# Patient Record
Sex: FEMALE | Race: WHITE | NOT HISPANIC OR LATINO | Employment: FULL TIME | URBAN - METROPOLITAN AREA
[De-identification: names, ages, dates, MRNs, and addresses within clinical notes are randomized per-mention and may not be internally consistent; named-entity substitution may affect disease eponyms.]

---

## 2024-02-14 ENCOUNTER — TELEPHONE (OUTPATIENT)
Dept: PRIMARY CARE | Facility: CLINIC | Age: 54
End: 2024-02-14
Payer: COMMERCIAL

## 2024-02-15 ENCOUNTER — APPOINTMENT (OUTPATIENT)
Dept: PRIMARY CARE | Facility: CLINIC | Age: 54
End: 2024-02-15
Payer: COMMERCIAL

## 2024-04-02 ENCOUNTER — APPOINTMENT (OUTPATIENT)
Dept: PRIMARY CARE | Facility: CLINIC | Age: 54
End: 2024-04-02
Payer: COMMERCIAL

## 2024-04-03 ENCOUNTER — OFFICE VISIT (OUTPATIENT)
Dept: PRIMARY CARE | Facility: CLINIC | Age: 54
End: 2024-04-03
Payer: COMMERCIAL

## 2024-04-03 VITALS
BODY MASS INDEX: 43 KG/M2 | WEIGHT: 274 LBS | OXYGEN SATURATION: 98 % | SYSTOLIC BLOOD PRESSURE: 111 MMHG | DIASTOLIC BLOOD PRESSURE: 77 MMHG | HEART RATE: 68 BPM | HEIGHT: 67 IN

## 2024-04-03 DIAGNOSIS — E55.9 VITAMIN D DEFICIENCY: ICD-10-CM

## 2024-04-03 DIAGNOSIS — M79.671 PAIN IN BOTH FEET: ICD-10-CM

## 2024-04-03 DIAGNOSIS — Z12.11 COLON CANCER SCREENING: ICD-10-CM

## 2024-04-03 DIAGNOSIS — J45.20 MILD INTERMITTENT ASTHMA, UNSPECIFIED WHETHER COMPLICATED (HHS-HCC): ICD-10-CM

## 2024-04-03 DIAGNOSIS — N18.31 STAGE 3A CHRONIC KIDNEY DISEASE (MULTI): ICD-10-CM

## 2024-04-03 DIAGNOSIS — M79.672 PAIN IN BOTH FEET: ICD-10-CM

## 2024-04-03 DIAGNOSIS — K21.9 GASTROESOPHAGEAL REFLUX DISEASE WITHOUT ESOPHAGITIS: ICD-10-CM

## 2024-04-03 DIAGNOSIS — Z23 NEED FOR ZOSTER VACCINATION: ICD-10-CM

## 2024-04-03 DIAGNOSIS — R06.81 APNEA: ICD-10-CM

## 2024-04-03 DIAGNOSIS — Z00.00 ANNUAL PHYSICAL EXAM: Primary | ICD-10-CM

## 2024-04-03 DIAGNOSIS — F31.9 BIPOLAR 1 DISORDER (MULTI): ICD-10-CM

## 2024-04-03 DIAGNOSIS — Z12.31 SCREENING MAMMOGRAM FOR BREAST CANCER: ICD-10-CM

## 2024-04-03 PROBLEM — E66.01 MORBID OBESITY (MULTI): Status: ACTIVE | Noted: 2024-04-03

## 2024-04-03 PROBLEM — F33.42 RECURRENT MAJOR DEPRESSIVE DISORDER, IN FULL REMISSION (CMS-HCC): Status: ACTIVE | Noted: 2024-04-03

## 2024-04-03 PROBLEM — G43.009 MIGRAINE WITHOUT AURA AND WITHOUT STATUS MIGRAINOSUS, NOT INTRACTABLE: Status: ACTIVE | Noted: 2024-04-03

## 2024-04-03 PROBLEM — F41.8 DEPRESSION WITH ANXIETY: Status: ACTIVE | Noted: 2019-10-30

## 2024-04-03 PROBLEM — K58.0 IRRITABLE BOWEL SYNDROME WITH DIARRHEA: Status: ACTIVE | Noted: 2024-04-03

## 2024-04-03 PROBLEM — N39.3 STRESS INCONTINENCE: Status: ACTIVE | Noted: 2024-04-03

## 2024-04-03 PROCEDURE — 90750 HZV VACC RECOMBINANT IM: CPT | Performed by: INTERNAL MEDICINE

## 2024-04-03 PROCEDURE — 99204 OFFICE O/P NEW MOD 45 MIN: CPT | Performed by: INTERNAL MEDICINE

## 2024-04-03 PROCEDURE — 1036F TOBACCO NON-USER: CPT | Performed by: INTERNAL MEDICINE

## 2024-04-03 PROCEDURE — 90471 IMMUNIZATION ADMIN: CPT | Performed by: INTERNAL MEDICINE

## 2024-04-03 RX ORDER — LEVONORGESTREL 52 MG/1
1 INTRAUTERINE DEVICE INTRAUTERINE ONCE
COMMUNITY

## 2024-04-03 RX ORDER — DULOXETIN HYDROCHLORIDE 60 MG/1
60 CAPSULE, DELAYED RELEASE ORAL DAILY
COMMUNITY
Start: 2016-03-21

## 2024-04-03 RX ORDER — CLONAZEPAM 0.5 MG/1
0.5 TABLET ORAL
COMMUNITY
Start: 2023-10-13

## 2024-04-03 RX ORDER — LEVALBUTEROL INHALATION SOLUTION 0.63 MG/3ML
1 SOLUTION RESPIRATORY (INHALATION) EVERY 6 HOURS PRN
Qty: 72 ML | Refills: 2 | Status: SHIPPED | OUTPATIENT
Start: 2024-04-03 | End: 2025-04-03

## 2024-04-03 RX ORDER — ARIPIPRAZOLE 10 MG/1
10 TABLET ORAL DAILY
COMMUNITY
Start: 2023-11-03

## 2024-04-03 RX ORDER — LANOLIN ALCOHOL/MO/W.PET/CERES
50 CREAM (GRAM) TOPICAL DAILY
COMMUNITY

## 2024-04-03 RX ORDER — ALBUTEROL SULFATE 90 UG/1
2 AEROSOL, METERED RESPIRATORY (INHALATION) EVERY 4 HOURS PRN
COMMUNITY
Start: 2005-12-02

## 2024-04-03 RX ORDER — LAMOTRIGINE 200 MG/1
200 TABLET ORAL DAILY
COMMUNITY
Start: 2016-03-16

## 2024-04-03 RX ORDER — LITHIUM CARBONATE 300 MG/1
600 CAPSULE ORAL DAILY
COMMUNITY
Start: 2016-03-16

## 2024-04-03 RX ORDER — OMEPRAZOLE 20 MG/1
20 TABLET, DELAYED RELEASE ORAL
COMMUNITY

## 2024-04-03 RX ORDER — ACETAMINOPHEN 500 MG
2000 TABLET ORAL DAILY
COMMUNITY
Start: 2024-03-07

## 2024-04-03 ASSESSMENT — ENCOUNTER SYMPTOMS
COUGH: 0
VOMITING: 0
SORE THROAT: 0
HEADACHES: 0
SHORTNESS OF BREATH: 0
FREQUENCY: 0
CONSTIPATION: 0
LIGHT-HEADEDNESS: 0
DIFFICULTY URINATING: 0
ARTHRALGIAS: 0
DIZZINESS: 0
WEAKNESS: 0
NAUSEA: 0
PALPITATIONS: 0
FATIGUE: 0
DIARRHEA: 0
DYSURIA: 0
HEMATURIA: 0
FEVER: 0
MYALGIAS: 0
CHILLS: 0

## 2024-04-03 ASSESSMENT — PAIN SCALES - GENERAL: PAINLEVEL: 0-NO PAIN

## 2024-04-03 NOTE — ASSESSMENT & PLAN NOTE
Patient does have mild asthma but uses Xopenex on a as needed basis.  She notes that she only has to use it a few times a year when she has exacerbations and has not used it for several months.

## 2024-04-03 NOTE — PATIENT INSTRUCTIONS
Set up mammogram    Get fasting labs    Try Dr Brooks or Ella for Mirena removal    Set up appointment with Samaria for feet    Set up appointment for sleep apnea eval    Follow up Dr Contreras in 1 year for annual physical

## 2024-04-03 NOTE — ASSESSMENT & PLAN NOTE
Patient sees a psychiatrist at [facility] and    Falls name Rosalinda Torres who manages all of her medications including lithium, Abilify, Cymbalta and Klonopin for her bipolar 1 disorder.

## 2024-04-03 NOTE — PROGRESS NOTES
Subjective   Patient ID: Elinor Velazquez is a 53 y.o. female who presents for new patient visit.    Patient is here to establish with new primary care physician as she has not had one in several years.  She does see a psychiatrist Dr. Lonnie Torres at [facility] and [facility] for her bipolar disorder.  Her significant other has noticed snoring, open mouth breathing and apneic episodes at night and he is quite concerned that she may have sleep apnea and would like an evaluation    Patient has mild asthma and uses Xopenex periodically when she has an exacerbation and does need a refill.  Albuterol makes her shaky and palpitations which is why she uses the Xopenex.      Finally she has foot problems constantly and ankle problems and would like a podiatry referral    Patient also has a Mirena in place she is not sure how old it is but it should be evaluated and removed so she was given an OB/GYN to have this evaluated as well.        Review of Systems   Constitutional:  Negative for chills, fatigue and fever.   HENT:  Negative for sore throat.    Eyes:  Negative for visual disturbance.   Respiratory:  Negative for cough and shortness of breath.    Cardiovascular:  Negative for chest pain, palpitations and leg swelling.   Gastrointestinal:  Negative for constipation, diarrhea, nausea and vomiting.   Genitourinary:  Negative for difficulty urinating, dysuria, frequency, hematuria and urgency.   Musculoskeletal:  Negative for arthralgias and myalgias.   Skin:  Negative for rash.   Neurological:  Negative for dizziness, syncope, weakness, light-headedness and headaches.       Objective   Medication Documentation Review Audit       Reviewed by Jaqui Contreras MD (Physician) on 04/03/24 at 0928      Medication Order Taking? Sig Documenting Provider Last Dose Status   albuterol 90 mcg/actuation inhaler [id number] Yes 2 puffs every 4 hours if needed. Historical Provider, MD  Active   ARIPiprazole (Abilify) 10 mg tablet  [id number] Yes Take 1 tablet (10 mg) by mouth once daily. Historical Provider, MD  Active   cholecalciferol (Vitamin D-3) 50 mcg (2,000 unit) capsule [id number] Yes Take 1 capsule (50 mcg) by mouth early in the morning.. Historical Provider, MD  Active   clonazePAM (KlonoPIN) 0.5 mg tablet [id number] Yes Take 1 tablet (0.5 mg) by mouth once daily. Historical Provider, MD  Active   DULoxetine (Cymbalta) 60 mg DR capsule [id number] Yes Take 1 capsule (60 mg) by mouth once daily. Historical Provider, MD  Active   lamoTRIgine (LaMICtal) 200 mg tablet [id number] Yes Take 1 tablet (200 mg) by mouth once daily. Historical Provider, MD  Active   levonorgestrel (Mirena) 21 mcg/24 hours (8 yrs) 52 mg IUD [id number]  52 mg by intrauterine route 1 time. Historical Provider, MD  Active   lithium 300 mg capsule [id number] Yes Take 2 capsules (600 mg) by mouth once daily. Historical Provider, MD  Active   multivitamin Complete formula with  (Complete ) 3,000-800 unit-mcg capsule [id number]  Take 1 capsule by mouth once daily. Historical Provider, MD  Active   omeprazole OTC (PriLOSEC OTC) 20 mg EC tablet [id number]  Take 1 tablet (20 mg) by mouth once daily. Historical Provider, MD  Active   pyridoxine (Vitamin B-6) 50 mg tablet [id number]  Take 1 tablet (50 mg) by mouth once daily. Historical Provider, MD  Active                  Allergies   Allergen Reactions    Carbamazepine Wheezing     Wheezing and chest tightness    Cat Dander Itching    Gabapentin Unknown    Grass Pollen Runny nose    House Dust Runny nose    Tree And Shrub Pollen Runny nose    Valacyclovir Wheezing     ppts asthma attack     Physical Exam  Constitutional:       Appearance: Normal appearance. She is obese.   HENT:      Head: Normocephalic and atraumatic.      Nose: Nose normal.   Eyes:      Extraocular Movements: Extraocular movements intact.      Pupils: Pupils are equal, round, and reactive to light.   Cardiovascular:      Rate and Rhythm: Normal rate and  "regular rhythm.   Pulmonary:      Breath sounds: Normal breath sounds.   Abdominal:      General: Abdomen is flat. Bowel sounds are normal.      Palpations: Abdomen is soft.   Musculoskeletal:      Right lower leg: No edema.      Left lower leg: No edema.   Neurological:      Mental Status: She is alert.     /77 (BP Location: Left arm, Patient Position: Sitting)   Pulse 68   Ht 1.702 m (5' 7\") Comment: w/o shoes on  Wt 124 kg (274 lb)   SpO2 98%   BMI 42.91 kg/m²       Assessment/Plan   Problem List Items Addressed This Visit       Asthma     Patient does have mild asthma but uses Xopenex on a as needed basis.  She notes that she only has to use it a few times a year when she has exacerbations and has not used it for several months.         Relevant Medications    levalbuterol (Xopenex) 0.63 mg/3 mL nebulizer solution    Bipolar 1 disorder (CMS/HCC)     Patient sees a psychiatrist at [facility] and    Falls name Rosalinda Torres who manages all of her medications including lithium, Abilify, Cymbalta and Klonopin for her bipolar 1 disorder.         Gastroesophageal reflux disease     Patient reports reflux symptoms which are well-controlled with the Prilosec 20 mg daily OTC.         Stage 3a chronic kidney disease (CMS/HCC)     The epic computer system pulled over chronic kidney disease however we are still pending lab work to confirm this diagnosis.         Vitamin D deficiency     Patient is due for blood work so we will be checking vitamin D level.         Annual physical exam - Primary     On general health maintenance patient did get 1 Shingrix vaccine but never got the second so she will be given the second shot today    Patient has a Mirena in place IUD but she is not sure how old it is.  I will refer her to OB/GYN for evaluation and possible removal of the Mirena    3.  Patient has never had a colon evaluated we discussed colonoscopy she declines.  She is however willing to go through with a Cologuard " test which we will order    Patient needs baseline labs which were ordered             Relevant Orders    Lipid Panel    CBC    Comprehensive Metabolic Panel    TSH with reflex to Free T4 if abnormal    Vitamin D 25-Hydroxy,Total (for eval of Vitamin D levels)    Apnea     Patient's significant other has noticed that she sleeps with her mouth open, and appears to have apneic episodes where she is not breathing and she wakes herself up.  We will give her referral to sleep medicine for proper evaluation for obstructive sleep apnea.         Relevant Orders    Referral to Adult Sleep Medicine     Other Visit Diagnoses       Screening mammogram for breast cancer        Relevant Orders    BI mammo bilateral screening tomosynthesis    Colon cancer screening        Relevant Orders    Cologuard® colon cancer screening    Pain in both feet        Relevant Orders    Referral to Podiatry                   It has been a pleasure seeing you.  Jaqui Contreras MD

## 2024-04-03 NOTE — ASSESSMENT & PLAN NOTE
The epic computer system pulled over chronic kidney disease however we are still pending lab work to confirm this diagnosis.

## 2024-04-03 NOTE — ASSESSMENT & PLAN NOTE
On general health maintenance patient did get 1 Shingrix vaccine but never got the second so she will be given the second shot today    Patient has a Mirena in place IUD but she is not sure how old it is.  I will refer her to OB/GYN for evaluation and possible removal of the Mirena    3.  Patient has never had a colon evaluated we discussed colonoscopy she declines.  She is however willing to go through with a Cologuard test which we will order    Patient needs baseline labs which were ordered

## 2024-04-03 NOTE — ASSESSMENT & PLAN NOTE
Patient's significant other has noticed that she sleeps with her mouth open, and appears to have apneic episodes where she is not breathing and she wakes herself up.  We will give her referral to sleep medicine for proper evaluation for obstructive sleep apnea.

## 2024-04-19 LAB — NONINV COLON CA DNA+OCC BLD SCRN STL QL: NEGATIVE

## 2024-05-06 ENCOUNTER — HOSPITAL ENCOUNTER (OUTPATIENT)
Dept: RADIOLOGY | Facility: EXTERNAL LOCATION | Age: 54
Discharge: HOME | End: 2024-05-06

## 2024-05-06 ENCOUNTER — LAB (OUTPATIENT)
Dept: LAB | Facility: LAB | Age: 54
End: 2024-05-06
Payer: COMMERCIAL

## 2024-05-06 ENCOUNTER — HOSPITAL ENCOUNTER (OUTPATIENT)
Dept: RADIOLOGY | Facility: CLINIC | Age: 54
Discharge: HOME | End: 2024-05-06
Payer: COMMERCIAL

## 2024-05-06 VITALS — WEIGHT: 274 LBS | HEIGHT: 67 IN | BODY MASS INDEX: 43 KG/M2

## 2024-05-06 DIAGNOSIS — N28.9 RENAL INSUFFICIENCY: Primary | ICD-10-CM

## 2024-05-06 DIAGNOSIS — Z00.00 ANNUAL PHYSICAL EXAM: ICD-10-CM

## 2024-05-06 DIAGNOSIS — Z12.31 SCREENING MAMMOGRAM FOR BREAST CANCER: ICD-10-CM

## 2024-05-06 LAB
25(OH)D3 SERPL-MCNC: 49 NG/ML (ref 30–100)
ALBUMIN SERPL BCP-MCNC: 4.2 G/DL (ref 3.4–5)
ALP SERPL-CCNC: 96 U/L (ref 33–110)
ALT SERPL W P-5'-P-CCNC: 7 U/L (ref 7–45)
ANION GAP SERPL CALC-SCNC: 11 MMOL/L (ref 10–20)
AST SERPL W P-5'-P-CCNC: 13 U/L (ref 9–39)
BILIRUB SERPL-MCNC: 0.6 MG/DL (ref 0–1.2)
BUN SERPL-MCNC: 14 MG/DL (ref 6–23)
CALCIUM SERPL-MCNC: 9.6 MG/DL (ref 8.6–10.6)
CHLORIDE SERPL-SCNC: 107 MMOL/L (ref 98–107)
CHOLEST SERPL-MCNC: 187 MG/DL (ref 0–199)
CHOLESTEROL/HDL RATIO: 3.2
CO2 SERPL-SCNC: 27 MMOL/L (ref 21–32)
CREAT SERPL-MCNC: 1.26 MG/DL (ref 0.5–1.05)
EGFRCR SERPLBLD CKD-EPI 2021: 51 ML/MIN/1.73M*2
ERYTHROCYTE [DISTWIDTH] IN BLOOD BY AUTOMATED COUNT: 13.2 % (ref 11.5–14.5)
GLUCOSE SERPL-MCNC: 96 MG/DL (ref 74–99)
HCT VFR BLD AUTO: 42.7 % (ref 36–46)
HDLC SERPL-MCNC: 57.9 MG/DL
HGB BLD-MCNC: 13.7 G/DL (ref 12–16)
LDLC SERPL CALC-MCNC: 99 MG/DL
MCH RBC QN AUTO: 29.5 PG (ref 26–34)
MCHC RBC AUTO-ENTMCNC: 32.1 G/DL (ref 32–36)
MCV RBC AUTO: 92 FL (ref 80–100)
NON HDL CHOLESTEROL: 129 MG/DL (ref 0–149)
NRBC BLD-RTO: 0 /100 WBCS (ref 0–0)
PLATELET # BLD AUTO: 349 X10*3/UL (ref 150–450)
POTASSIUM SERPL-SCNC: 4.1 MMOL/L (ref 3.5–5.3)
PROT SERPL-MCNC: 7.2 G/DL (ref 6.4–8.2)
RBC # BLD AUTO: 4.64 X10*6/UL (ref 4–5.2)
SODIUM SERPL-SCNC: 141 MMOL/L (ref 136–145)
TRIGL SERPL-MCNC: 153 MG/DL (ref 0–149)
TSH SERPL-ACNC: 3.51 MIU/L (ref 0.44–3.98)
VLDL: 31 MG/DL (ref 0–40)
WBC # BLD AUTO: 7.1 X10*3/UL (ref 4.4–11.3)

## 2024-05-06 PROCEDURE — 36415 COLL VENOUS BLD VENIPUNCTURE: CPT

## 2024-05-06 PROCEDURE — 85027 COMPLETE CBC AUTOMATED: CPT

## 2024-05-06 PROCEDURE — 84443 ASSAY THYROID STIM HORMONE: CPT

## 2024-05-06 PROCEDURE — 77067 SCR MAMMO BI INCL CAD: CPT | Performed by: RADIOLOGY

## 2024-05-06 PROCEDURE — 82306 VITAMIN D 25 HYDROXY: CPT

## 2024-05-06 PROCEDURE — 80053 COMPREHEN METABOLIC PANEL: CPT

## 2024-05-06 PROCEDURE — 77063 BREAST TOMOSYNTHESIS BI: CPT | Performed by: RADIOLOGY

## 2024-05-06 PROCEDURE — 77067 SCR MAMMO BI INCL CAD: CPT

## 2024-05-06 PROCEDURE — 80061 LIPID PANEL: CPT

## 2024-05-09 ENCOUNTER — TELEPHONE (OUTPATIENT)
Dept: PRIMARY CARE | Facility: CLINIC | Age: 54
End: 2024-05-09
Payer: COMMERCIAL

## 2024-05-09 NOTE — TELEPHONE ENCOUNTER
----- Message from Jaqui Contreras MD sent at 5/9/2024  7:22 AM EDT -----  Please notify patient that her mammogram showed no evidence of cancer.  Her next mammogram will be due in 1 year.

## 2024-05-10 ENCOUNTER — TELEPHONE (OUTPATIENT)
Dept: PRIMARY CARE | Facility: CLINIC | Age: 54
End: 2024-05-10
Payer: COMMERCIAL

## 2024-05-13 ENCOUNTER — OFFICE VISIT (OUTPATIENT)
Dept: PODIATRY | Facility: CLINIC | Age: 54
End: 2024-05-13
Payer: COMMERCIAL

## 2024-05-13 DIAGNOSIS — M21.42 PES PLANUS OF BOTH FEET: ICD-10-CM

## 2024-05-13 DIAGNOSIS — M79.672 PAIN IN BOTH FEET: ICD-10-CM

## 2024-05-13 DIAGNOSIS — M20.42 HAMMERTOES OF BOTH FEET: Primary | ICD-10-CM

## 2024-05-13 DIAGNOSIS — M20.41 HAMMERTOES OF BOTH FEET: Primary | ICD-10-CM

## 2024-05-13 DIAGNOSIS — M79.671 PAIN IN BOTH FEET: ICD-10-CM

## 2024-05-13 DIAGNOSIS — M21.41 PES PLANUS OF BOTH FEET: ICD-10-CM

## 2024-05-13 PROCEDURE — 99202 OFFICE O/P NEW SF 15 MIN: CPT | Performed by: PODIATRIST

## 2024-05-14 NOTE — PROGRESS NOTES
CC: foot pain    HPI:  Patient seen as new pt for foot pain to the toes and feet, no acute injury, pain with walking, long standing problem    PCP: Dr. Contreras  Last visit: 4-3-24     PMH  No past medical history on file.  MEDS    Current Outpatient Medications:     albuterol 90 mcg/actuation inhaler, 2 puffs every 4 hours if needed., Disp: , Rfl:     ARIPiprazole (Abilify) 10 mg tablet, Take 1 tablet (10 mg) by mouth once daily., Disp: , Rfl:     cholecalciferol (Vitamin D-3) 50 mcg (2,000 unit) capsule, Take 1 capsule (50 mcg) by mouth early in the morning.., Disp: , Rfl:     clonazePAM (KlonoPIN) 0.5 mg tablet, Take 1 tablet (0.5 mg) by mouth once daily., Disp: , Rfl:     DULoxetine (Cymbalta) 60 mg DR capsule, Take 1 capsule (60 mg) by mouth once daily., Disp: , Rfl:     lamoTRIgine (LaMICtal) 200 mg tablet, Take 1 tablet (200 mg) by mouth once daily., Disp: , Rfl:     levalbuterol (Xopenex) 0.63 mg/3 mL nebulizer solution, Take 1 ampule by nebulization every 6 hours if needed for wheezing., Disp: 72 mL, Rfl: 2    levonorgestrel (Mirena) 21 mcg/24 hours (8 yrs) 52 mg IUD, 52 mg by intrauterine route 1 time., Disp: , Rfl:     lithium 300 mg capsule, Take 2 capsules (600 mg) by mouth once daily., Disp: , Rfl:     multivitamin Complete formula with  (Complete ) 3,000-800 unit-mcg capsule, Take 1 capsule by mouth once daily., Disp: , Rfl:     omeprazole OTC (PriLOSEC OTC) 20 mg EC tablet, Take 1 tablet (20 mg) by mouth once daily., Disp: , Rfl:     pyridoxine (Vitamin B-6) 50 mg tablet, Take 1 tablet (50 mg) by mouth once daily., Disp: , Rfl:   Allergies  Allergies   Allergen Reactions    Carbamazepine Wheezing     Wheezing and chest tightness    Cat Dander Itching    Gabapentin Unknown    Grass Pollen Runny nose    House Dust Runny nose    Tree And Shrub Pollen Runny nose    Valacyclovir Wheezing     ppts asthma attack     Social History     Socioeconomic History    Marital status: Significant Other      Spouse name: Not on file    Number of children: 0    Years of education: Not on file    Highest education level: Bachelor's degree (e.g., BA, AB, BS)   Occupational History    Occupation: customer service on phones   Tobacco Use    Smoking status: Former     Current packs/day: 0.00     Average packs/day: 0.3 packs/day for 4.0 years (1.0 ttl pk-yrs)     Types: Cigarettes     Start date:      Quit date:      Years since quittin.3     Passive exposure: Never    Smokeless tobacco: Never   Substance and Sexual Activity    Alcohol use: Yes     Comment: once or twice a month if any    Drug use: Yes     Types: Marijuana     Comment: Medical card    Sexual activity: Not on file   Other Topics Concern    Not on file   Social History Narrative    Not on file     Social Determinants of Health     Financial Resource Strain: Not on file   Food Insecurity: Not on file   Transportation Needs: Not on file   Physical Activity: Not on file   Stress: Not on file   Social Connections: Not on file   Intimate Partner Violence: Not on file   Housing Stability: Not on file     Family History   Problem Relation Name Age of Onset    Bipolar disorder Mother      Osteoporosis Mother      Diabetes Father      Emphysema Maternal Grandmother      Lung cancer Maternal Grandfather       Past Surgical History:   Procedure Laterality Date    ANKLE FRACTURE SURGERY Left     DILATION AND CURETTAGE OF UTERUS      WISDOM TOOTH EXTRACTION         REVIEW OF SYSTEMS    + as noted above in HPI.       Physical examination:   On General Observation: Patient is a pleasant, cooperative, well developed 54 y.o.  adult female. The patient is alert and oriented to time, place and person. Patient has normal affect and mood.  There were no vitals taken for this visit.    Vascular:  DP and PT pulses are 2/4 b/l.  mid edema noted. no varicosities b/l.  CFT  5 seconds to all digits bilateral.  Skin temperature is warm to warm from proximal to distal  bilateral.      Muscular: Strength is 5/5 b/l.  Motor strength is normal and symmetric proximally, as well as distally, in all four extremities. Good mobility of all extremities.  Tenderness to toes.     Neuro:  Proprioception present.   Sensation to vibration is  intact. Protective sensation present  at all pedal sites via [place] Jose F 5.07 monofilament bilateral.  Light touch present bilateral.     Derm:  No rashes, lesions, or ecchymosis.     Ortho:  Reducible hammertoes are present toes 2-4 b/l le, reducible at the pipj, bunion is present 1st mpj b/l le, loss of the medial arch, heel inversion with toe raise.      ASSESSMENT:    Hammertoes b/l l  HAV  Pes planus  Pain feet     PLAN:   Consult  A comprehensive history and physical examination were preformed. The patient was educated on clinical findings, diagnosis and treatment plans. Patient understands all that has been explained and all questions were answered to apparent satisfaction.   -Reviewed etiology of the above and treatment options, will obtain xrays of the feet and recommend orthotics with crest pads to address the hammertoes and pes planus.    Thank you for referral        Phuc Mera DPM

## 2024-05-15 ENCOUNTER — HOSPITAL ENCOUNTER (OUTPATIENT)
Dept: RADIOLOGY | Facility: CLINIC | Age: 54
Discharge: HOME | End: 2024-05-15
Payer: COMMERCIAL

## 2024-05-15 DIAGNOSIS — M79.671 PAIN IN BOTH FEET: ICD-10-CM

## 2024-05-15 DIAGNOSIS — M20.42 HAMMERTOES OF BOTH FEET: ICD-10-CM

## 2024-05-15 DIAGNOSIS — M79.672 PAIN IN BOTH FEET: ICD-10-CM

## 2024-05-15 DIAGNOSIS — M20.41 HAMMERTOES OF BOTH FEET: ICD-10-CM

## 2024-05-15 PROCEDURE — 73630 X-RAY EXAM OF FOOT: CPT | Mod: BILATERAL PROCEDURE | Performed by: RADIOLOGY

## 2024-05-15 PROCEDURE — 73630 X-RAY EXAM OF FOOT: CPT | Mod: 50

## 2024-05-21 ENCOUNTER — TELEPHONE (OUTPATIENT)
Dept: PRIMARY CARE | Facility: CLINIC | Age: 54
End: 2024-05-21
Payer: COMMERCIAL

## 2024-05-21 NOTE — TELEPHONE ENCOUNTER
B/C & B/S OF ILL PPO PLUS PLAN  [phone]  PER: AVR  EFFECTIVE 11/1/22 TO CURRENT  JUSTIN IS IN NETWORK  CPT  X2  DX M21.41 AND M21.42-AVR STATES THAT ORTHOTICS ARE COVERED, BASED ON MEDICAL NECESSITY BUT DOES NOT STATE WHETHER DX M21.41 AND M21.42 ARE SPECIFICALLY COVERED DIAGNOSIS'.  NO COPAY APPLIES  PLAN PAYS AT 90% OF THE ALLOWED AMT, ONCE INDIVIDUAL DEDUCTIBLE OF $1100 HAS BEEN MET. SO FAR, $666.26 HAS BEEN SATISFIED.  NO PRIOR AUTHORIZATION IS NEEDED  AVR REFERENCE #[id number]    OM FOR PATIENT WITH THE ABOVE INFO. STRESSED THE POINT THAT I DID NOT SPEAK W A LIVE PERSON. AVR ONLY. WAS TOLD THAT ORTHOTICS ARE COVERED, BASED ON MEDICAL NECESSITY. WAS UNABLE TO CHECK SPECIFIC DIAGNOSIS. ASKED PATIENT TO CALL THE OFFICE TO SCHEDULE, IF SHE WISHES TO PROCEED. THANK YOU!

## 2024-05-21 NOTE — TELEPHONE ENCOUNTER
----- Message from Phuc Mera DPM sent at 5/14/2024 12:21 PM EDT -----  Regarding: orthotics  Please check orthotics dx hammertoes and pes planus thanks

## 2024-05-30 ENCOUNTER — OFFICE VISIT (OUTPATIENT)
Dept: PODIATRY | Facility: CLINIC | Age: 54
End: 2024-05-30
Payer: COMMERCIAL

## 2024-05-30 DIAGNOSIS — M21.42 PES PLANUS OF BOTH FEET: ICD-10-CM

## 2024-05-30 DIAGNOSIS — B35.1 TINEA UNGUIUM: Primary | ICD-10-CM

## 2024-05-30 DIAGNOSIS — M21.41 PES PLANUS OF BOTH FEET: ICD-10-CM

## 2024-05-30 DIAGNOSIS — M21.41 PES PLANUS OF RIGHT FOOT: ICD-10-CM

## 2024-05-30 DIAGNOSIS — M21.42 PES PLANUS OF LEFT FOOT: ICD-10-CM

## 2024-05-30 PROCEDURE — L3020 FOOT LONGITUD/METATARSAL SUP: HCPCS | Performed by: PODIATRIST

## 2024-05-30 PROCEDURE — 99212 OFFICE O/P EST SF 10 MIN: CPT | Performed by: PODIATRIST

## 2024-05-30 RX ORDER — CICLOPIROX 80 MG/ML
SOLUTION TOPICAL NIGHTLY
Qty: 6.6 ML | Refills: 3 | Status: SHIPPED | OUTPATIENT
Start: 2024-05-30

## 2024-05-30 NOTE — PROGRESS NOTES
CC: foot pain     HPI:  Patient seen as new pt for foot pain to the toes and feet, no acute injury, pain with walking, long standing problem, here to be casted for orthotics. Also nail fungus of 2 toes,     PCP: Dr. Contreras  Last visit: 4-3-24      PMH  Medical History   No past medical history on file.     MEDS     Current Outpatient Medications:     albuterol 90 mcg/actuation inhaler, 2 puffs every 4 hours if needed., Disp: , Rfl:     ARIPiprazole (Abilify) 10 mg tablet, Take 1 tablet (10 mg) by mouth once daily., Disp: , Rfl:     cholecalciferol (Vitamin D-3) 50 mcg (2,000 unit) capsule, Take 1 capsule (50 mcg) by mouth early in the morning.., Disp: , Rfl:     clonazePAM (KlonoPIN) 0.5 mg tablet, Take 1 tablet (0.5 mg) by mouth once daily., Disp: , Rfl:     DULoxetine (Cymbalta) 60 mg DR capsule, Take 1 capsule (60 mg) by mouth once daily., Disp: , Rfl:     lamoTRIgine (LaMICtal) 200 mg tablet, Take 1 tablet (200 mg) by mouth once daily., Disp: , Rfl:     levalbuterol (Xopenex) 0.63 mg/3 mL nebulizer solution, Take 1 ampule by nebulization every 6 hours if needed for wheezing., Disp: 72 mL, Rfl: 2    levonorgestrel (Mirena) 21 mcg/24 hours (8 yrs) 52 mg IUD, 52 mg by intrauterine route 1 time., Disp: , Rfl:     lithium 300 mg capsule, Take 2 capsules (600 mg) by mouth once daily., Disp: , Rfl:     multivitamin Complete formula with  (Complete ) 3,000-800 unit-mcg capsule, Take 1 capsule by mouth once daily., Disp: , Rfl:     omeprazole OTC (PriLOSEC OTC) 20 mg EC tablet, Take 1 tablet (20 mg) by mouth once daily., Disp: , Rfl:     pyridoxine (Vitamin B-6) 50 mg tablet, Take 1 tablet (50 mg) by mouth once daily., Disp: , Rfl:   Allergies        Allergies   Allergen Reactions    Carbamazepine Wheezing       Wheezing and chest tightness    Cat Dander Itching    Gabapentin Unknown    Grass Pollen Runny nose    House Dust Runny nose    Tree And Shrub Pollen Runny nose    Valacyclovir Wheezing       ppts  asthma attack      Social History   Social History            Socioeconomic History    Marital status: Significant Other       Spouse name: Not on file    Number of children: 0    Years of education: Not on file    Highest education level: Bachelor's degree (e.g., BA, AB, BS)   Occupational History    Occupation: customer service on phones   Tobacco Use    Smoking status: Former       Current packs/day: 0.00       Average packs/day: 0.3 packs/day for 4.0 years (1.0 ttl pk-yrs)       Types: Cigarettes       Start date:        Quit date:        Years since quittin.3       Passive exposure: Never    Smokeless tobacco: Never   Substance and Sexual Activity    Alcohol use: Yes       Comment: once or twice a month if any    Drug use: Yes       Types: Marijuana       Comment: Medical card    Sexual activity: Not on file   Other Topics Concern    Not on file   Social History Narrative    Not on file      Social Determinants of Health      Financial Resource Strain: Not on file   Food Insecurity: Not on file   Transportation Needs: Not on file   Physical Activity: Not on file   Stress: Not on file   Social Connections: Not on file   Intimate Partner Violence: Not on file   Housing Stability: Not on file         Family History          Family History   Problem Relation Name Age of Onset    Bipolar disorder Mother        Osteoporosis Mother        Diabetes Father        Emphysema Maternal Grandmother        Lung cancer Maternal Grandfather             Surgical History         Past Surgical History:   Procedure Laterality Date    ANKLE FRACTURE SURGERY Left      DILATION AND CURETTAGE OF UTERUS        WISDOM TOOTH EXTRACTION                REVIEW OF SYSTEMS     + as noted above in HPI.         Physical examination:   On General Observation: Patient is a pleasant, cooperative, well developed 54 y.o.  adult female. The patient is alert and oriented to time, place and person. Patient has normal affect and mood.  There  were no vitals taken for this visit.     Vascular:  DP and PT pulses are 2/4 b/l.  mid edema noted. no varicosities b/l.  CFT  5 seconds to all digits bilateral.  Skin temperature is warm to warm from proximal to distal bilateral.       Muscular: Strength is 5/5 b/l.  Motor strength is normal and symmetric proximally, as well as distally, in all four extremities. Good mobility of all extremities.  Tenderness to toes.      Neuro:  Proprioception present.   Sensation to vibration is  intact. Protective sensation present  at all pedal sites via [place] Jose F 5.07 monofilament bilateral.  Light touch present bilateral.      Derm:  No rashes, lesions, or ecchymosis. Nails 4right and 5th left are thick, crumbly and discolored.     Ortho:  Reducible hammertoes are present toes 2-4 b/l le, reducible at the pipj, bunion is present 1st mpj b/l le, loss of the medial arch, heel inversion with toe raise.     Xrays:     Narrative & Impression   Interpreted By:  Spencer Headley,   STUDY:  XR FOOT 3+ VIEWS BILATERAL      INDICATION:  Signs/Symptoms:hammertoes.      COMPARISON:  August 22, 2016      ACCESSION NUMBER(S):  [id number]      ORDERING CLINICIAN:  COURTNEY ANDERSON      FINDINGS:  Bunion with mild hallux valgus bilaterally.      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      Bilateral calcaneal spurs.      Previous left fibular fracture fixation.      IMPRESSION:      Bunion with mild hallux valgus bilaterally.      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      Bilateral calcaneal spurs.     ASSESSMENT:    B35.1  Hammertoes b/l l  HAV  Pes planusOA/Heel Spurs  Pain feet      PLAN:     Exam  Reviewed etiology of nail fungus and treatment options, will start topical penlac  Reviewed xrays with pt  Casted for custom orthotics biofoam sport, will call when arrives.

## 2024-07-12 ENCOUNTER — APPOINTMENT (OUTPATIENT)
Dept: PODIATRY | Facility: CLINIC | Age: 54
End: 2024-07-12
Payer: COMMERCIAL

## 2024-07-12 DIAGNOSIS — M21.41 PES PLANUS OF RIGHT FOOT: Primary | ICD-10-CM

## 2024-07-12 DIAGNOSIS — M21.42 PES PLANUS OF LEFT FOOT: ICD-10-CM

## 2024-07-12 PROCEDURE — 1036F TOBACCO NON-USER: CPT | Performed by: PODIATRIST

## 2024-07-12 PROCEDURE — 99212 OFFICE O/P EST SF 10 MIN: CPT | Performed by: PODIATRIST

## 2024-07-12 NOTE — PROGRESS NOTES
CC: foot pain     HPI:  Patient seen to  orthotics.     PCP: Dr. Contreras  Last visit: 4-3-24      PMH  Medical History   No past medical history on file.      MEDS     Current Outpatient Medications:     albuterol 90 mcg/actuation inhaler, 2 puffs every 4 hours if needed., Disp: , Rfl:     ARIPiprazole (Abilify) 10 mg tablet, Take 1 tablet (10 mg) by mouth once daily., Disp: , Rfl:     cholecalciferol (Vitamin D-3) 50 mcg (2,000 unit) capsule, Take 1 capsule (50 mcg) by mouth early in the morning.., Disp: , Rfl:     clonazePAM (KlonoPIN) 0.5 mg tablet, Take 1 tablet (0.5 mg) by mouth once daily., Disp: , Rfl:     DULoxetine (Cymbalta) 60 mg DR capsule, Take 1 capsule (60 mg) by mouth once daily., Disp: , Rfl:     lamoTRIgine (LaMICtal) 200 mg tablet, Take 1 tablet (200 mg) by mouth once daily., Disp: , Rfl:     levalbuterol (Xopenex) 0.63 mg/3 mL nebulizer solution, Take 1 ampule by nebulization every 6 hours if needed for wheezing., Disp: 72 mL, Rfl: 2    levonorgestrel (Mirena) 21 mcg/24 hours (8 yrs) 52 mg IUD, 52 mg by intrauterine route 1 time., Disp: , Rfl:     lithium 300 mg capsule, Take 2 capsules (600 mg) by mouth once daily., Disp: , Rfl:     multivitamin Complete formula with  (Complete ) 3,000-800 unit-mcg capsule, Take 1 capsule by mouth once daily., Disp: , Rfl:     omeprazole OTC (PriLOSEC OTC) 20 mg EC tablet, Take 1 tablet (20 mg) by mouth once daily., Disp: , Rfl:     pyridoxine (Vitamin B-6) 50 mg tablet, Take 1 tablet (50 mg) by mouth once daily., Disp: , Rfl:   Allergies            Allergies   Allergen Reactions    Carbamazepine Wheezing       Wheezing and chest tightness    Cat Dander Itching    Gabapentin Unknown    Grass Pollen Runny nose    House Dust Runny nose    Tree And Shrub Pollen Runny nose    Valacyclovir Wheezing       ppts asthma attack      Social History   Social History                Socioeconomic History    Marital status: Significant Other       Spouse name:  Not on file    Number of children: 0    Years of education: Not on file    Highest education level: Bachelor's degree (e.g., BA, AB, BS)   Occupational History    Occupation: customer service on phones   Tobacco Use    Smoking status: Former       Current packs/day: 0.00       Average packs/day: 0.3 packs/day for 4.0 years (1.0 ttl pk-yrs)       Types: Cigarettes       Start date:        Quit date:        Years since quittin.3       Passive exposure: Never    Smokeless tobacco: Never   Substance and Sexual Activity    Alcohol use: Yes       Comment: once or twice a month if any    Drug use: Yes       Types: Marijuana       Comment: Medical card    Sexual activity: Not on file   Other Topics Concern    Not on file   Social History Narrative    Not on file      Social Determinants of Health      Financial Resource Strain: Not on file   Food Insecurity: Not on file   Transportation Needs: Not on file   Physical Activity: Not on file   Stress: Not on file   Social Connections: Not on file   Intimate Partner Violence: Not on file   Housing Stability: Not on file         Family History               Family History   Problem Relation Name Age of Onset    Bipolar disorder Mother        Osteoporosis Mother        Diabetes Father        Emphysema Maternal Grandmother        Lung cancer Maternal Grandfather             Surgical History             Past Surgical History:   Procedure Laterality Date    ANKLE FRACTURE SURGERY Left      DILATION AND CURETTAGE OF UTERUS        WISDOM TOOTH EXTRACTION                REVIEW OF SYSTEMS     + as noted above in HPI.         Physical examination:   On General Observation: Patient is a pleasant, cooperative, well developed 54 y.o.  adult female. The patient is alert and oriented to time, place and person. Patient has normal affect and mood.  There were no vitals taken for this visit.     Vascular:  DP and PT pulses are 2/4 b/l.  mid edema noted. no varicosities b/l.  CFT  5  seconds to all digits bilateral.  Skin temperature is warm to warm from proximal to distal bilateral.       Muscular: Strength is 5/5 b/l.  Motor strength is normal and symmetric proximally, as well as distally, in all four extremities. Good mobility of all extremities.  Tenderness to toes.      Neuro:  Proprioception present.   Sensation to vibration is  intact. Protective sensation present  at all pedal sites via Empire Jose F 5.07 monofilament bilateral.  Light touch present bilateral.      Derm:  No rashes, lesions, or ecchymosis. Nails 4right and 5th left are thick, crumbly and discolored.     Ortho:  Reducible hammertoes are present toes 2-4 b/l le, reducible at the pipj, bunion is present 1st mpj b/l le, loss of the medial arch, heel inversion with toe raise.     Xrays:     Narrative & Impression   Interpreted By:  Spencer Headley,   STUDY:  XR FOOT 3+ VIEWS BILATERAL      INDICATION:  Signs/Symptoms:hammertoes.      COMPARISON:  August 22, 2016      ACCESSION NUMBER(S):  [id number]      ORDERING CLINICIAN:  COURTNEY ANDERSON      FINDINGS:  Bunion with mild hallux valgus bilaterally.      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      Bilateral calcaneal spurs.      Previous left fibular fracture fixation.      IMPRESSION:      Bunion with mild hallux valgus bilaterally.      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      Bilateral calcaneal spurs.      ASSESSMENT:      Pes planus       PLAN:      Exam  Dispensed orthotics with oral and written home going instructions  Follow up 4 weeks if any issues.

## 2024-07-23 ENCOUNTER — OFFICE VISIT (OUTPATIENT)
Dept: SLEEP MEDICINE | Facility: CLINIC | Age: 54
End: 2024-07-23
Payer: COMMERCIAL

## 2024-07-23 VITALS
HEIGHT: 67 IN | WEIGHT: 262 LBS | DIASTOLIC BLOOD PRESSURE: 82 MMHG | BODY MASS INDEX: 41.12 KG/M2 | HEART RATE: 71 BPM | SYSTOLIC BLOOD PRESSURE: 134 MMHG

## 2024-07-23 DIAGNOSIS — R06.81 APNEA: ICD-10-CM

## 2024-07-23 DIAGNOSIS — R29.818 SUSPECTED SLEEP APNEA: Primary | ICD-10-CM

## 2024-07-23 PROCEDURE — G2211 COMPLEX E/M VISIT ADD ON: HCPCS | Performed by: NURSE PRACTITIONER

## 2024-07-23 PROCEDURE — 1036F TOBACCO NON-USER: CPT | Performed by: NURSE PRACTITIONER

## 2024-07-23 PROCEDURE — 3008F BODY MASS INDEX DOCD: CPT | Performed by: NURSE PRACTITIONER

## 2024-07-23 PROCEDURE — 99204 OFFICE O/P NEW MOD 45 MIN: CPT | Performed by: NURSE PRACTITIONER

## 2024-07-23 PROCEDURE — 99214 OFFICE O/P EST MOD 30 MIN: CPT | Performed by: NURSE PRACTITIONER

## 2024-07-23 ASSESSMENT — SLEEP AND FATIGUE QUESTIONNAIRES
SATISFACTION_WITH_CURRENT_SLEEP_PATTERN: SATISFIED
SLEEP_PROBLEM_NOTICEABLE_TO_OTHERS: A LITTLE
HOW LIKELY ARE YOU TO NOD OFF OR FALL ASLEEP WHEN YOU ARE A PASSENGER IN A CAR FOR AN HOUR WITHOUT A BREAK: SLIGHT CHANCE OF DOZING
ESS-CHAD TOTAL SCORE: 9
WORRIED_DISTRESSED_DUE_TO_SLEEP: SOMEWHAT
HOW LIKELY ARE YOU TO NOD OFF OR FALL ASLEEP WHILE WATCHING TV: HIGH CHANCE OF DOZING
SLEEP_PROBLEM_INTERFERES_DAILY_ACTIVITIES: A LITTLE
WAKING_TOO_EARLY: MILD
HOW LIKELY ARE YOU TO NOD OFF OR FALL ASLEEP IN A CAR, WHILE STOPPED FOR A FEW MINUTES IN TRAFFIC: SLIGHT CHANCE OF DOZING
SITING INACTIVE IN A PUBLIC PLACE LIKE A CLASS ROOM OR A MOVIE THEATER: WOULD NEVER DOZE
DIFFICULTY_FALLING_ASLEEP: MILD
HOW LIKELY ARE YOU TO NOD OFF OR FALL ASLEEP WHILE LYING DOWN TO REST IN THE AFTERNOON WHEN CIRCUMSTANCES PERMIT: HIGH CHANCE OF DOZING
HOW LIKELY ARE YOU TO NOD OFF OR FALL ASLEEP WHILE SITTING AND READING: SLIGHT CHANCE OF DOZING
HOW LIKELY ARE YOU TO NOD OFF OR FALL ASLEEP WHILE SITTING QUIETLY AFTER LUNCH WITHOUT ALCOHOL: WOULD NEVER DOZE
HOW LIKELY ARE YOU TO NOD OFF OR FALL ASLEEP WHILE SITTING AND TALKING TO SOMEONE: WOULD NEVER DOZE

## 2024-07-23 ASSESSMENT — PAIN SCALES - GENERAL: PAINLEVEL: 0-NO PAIN

## 2024-07-23 NOTE — PATIENT INSTRUCTIONS
Home sleep study   Will send you an update via [employer] when testing complete            [facility] Sleep Medicine  [facility] [address]  [facility]  [address] [place] [address]       NAME: Elinor Velazquez   DATE:  07/23/24    DIAGNOSIS:   1. Suspected sleep apnea  Home sleep apnea test (HSAT)      2. Apnea  Referral to Adult Sleep Medicine          Thank you for coming to the Sleep Medicine Clinic today! Your sleep medicine provider today was: RHONDA Browning Below is a summary of your treatment plan, other important information, and our contact numbers:    TREATMENT PLAN:   - Get your sleep study scheduled  - Follow-up in 2-3 months.  - If not already done, sign up for 'My Chart' and send prescription requests or messages through this      Scheduling a Sleep Study    Call the  Sleep Testing Center to speak with a sleep testing  to book your overnight sleep study procedure at one of our adult and pediatric-friendly sleep labs. Overnight sleep studies may be scheduled on a weekday or weekend.    We have child life services on a case by case basis at the [facility] location. We also perform daytime testing for shift workers on a case by case basis.    Locations for sleep studies are: [facility], [facility] ([facility]), [facility], [facility], [place], [place].    Bring your usual medications and nightly routine items for your sleep study. In order to fall asleep faster in sleep lab, we advise patients to wake up earlier on the morning of the scheduled testing and avoid napping prior to testing. Sometimes, your provider may prescribe a sleep aid to be taken at lights out in the sleep lab. If you are taking a sleep aid, please have somebody pick you up after the sleep testing.    Results of your sleep study will be given to the ordering clinician. Please contact their office for results or follow up as directed by  your clinician.    For additional information about the sleep medicine services, please call [phone]       Obstructive sleep apnea (ALLI): ALLI is a sleep disorder where your upper airway muscles relax during sleep and the airway intermittently and repetitively narrows and collapses leading to blocked airway (apnea) which, in turn, can disrupt breathing in sleep, lower oxygen levels while you sleep and cause night time wakings. Because apnea may cause higher carbon dioxide or low oxygen levels, untreated ALLI can lead to heart arrhythmia, elevation of blood pressure, and make it harder for the body to consolidate memory and metabolize (leading to higher blood sugars at night).   Frequent partial arousals occur during sleep resulting in sleep deprivation and daytime sleepiness. ALLI is associated with an increased risk of cardiovascular disease, stroke, hypertension, and insulin resistance. Moreover, untreated ALLI with excessive daytime sleepiness can increase the risk of motor vehicular accidents.    Some conservative strategies for ALLI are:   Positional therapy - Avoid sleeping on your back.   Healthy diet, exercise, and optimizing weight encouraged.   Avoid alcohol late in the evening as it can make sleep apnea worse.     Safety: Avoid driving and operating heavy equipment while sleepy. Drowsy driving may lead to life-threatening motor vehicle accidents.     Common treatment options for sleep apnea include weight management, positional therapy, Positive Airway Therapy (PAP) therapy, oral appliance therapy, hypoglossal nerve stimulation, and select airway surgeries.     Instructions - Common ALLI Recs: - For your sleep apnea, continue to use your PAP every night and use it whenever you are sleeping.   - Avoid alcohol or sedatives several hours prior to sleeping.   - Get additional supplies for your PAP (e.g., mask, hose, filters) every 3 months or as your insurance allows from your [email or web address] company. Replacement cushions  for your PAP mask can be requested monthly if airseals are an issue.  - Remember to clean your mask, tubings, and water chamber regularly as instructed.  - Avoid driving or operating heavy machinery when drowsy. A person driving while sleepy is five (5) times more likely to have an accident. If you feel sleepy, pull over and take a short power nap (sleep for less than 30 minutes). Otherwise, ask somebody to drive you.    EASY WAYS TO IMPROVE YOUR SLEEP:  1. Go to bed and wake up at the same time every day.   Aim for 8 hours but some people need less, some need more.   Get out of bed if you are not sleeping.   Limit naps to 20 min or less.   2. Expose yourself to daylight and/ or bright light in the morning.   Go outside or spend time near a window each morning.   You can use a light box (found on [employer]) if you wake before the sunrise.   Limit light exposure in the evenings (including electronic usage).   Try meditation, reading, stretching, deep breathing, warm shower or bath, or yoga nidra as part of your bedtime routine. There are many great FREE, videos or audio tracks on [employer]/ [employer], etc for guidance.  3. Exercise, in some form, EVERY day, but not too close to bedtime. Consider making this part of your routine at the start of your day, followed by a cool shower.  4. Eat meals at roughly the same time every day. Make sure you are prioritizing fruits, vegetables, whole grains, lean proteins.  5. Time your caffeine intake. Make sure you are not drinking caffeine within 8 to 12 hours prior to your bedtime.   6. Avoid marijuana, alcohol, and nicotine. They will reduce sleep quality in any quantity.  7. Learn to manage anxiety. Psychology services at  can be reached at [phone] to schedule an appointment.     IMPORTANT INFORMATION:     Call 911 for medical emergencies.  Our offices are generally open from Monday-Friday, 9 am - 5 pm.  If you need to get in touch with me, you may either call me and my  team(number is below) or you can use [employer].  If a referral for a test, for CPAP, or for another specialist was made, and you have not heard about scheduling this within a week, please call scheduling at [phone] ([phone]).  If you are unable to make your appointment for clinic or an overnight study, kindly call the office at least 48 hours in advance to cancel and reschedule.  If you are on CPAP, please bring your device's card to each clinic appointment unless told otherwise by your provider.  There are no supporting services by either the sleep doctors or their staff on weekends and Holidays, or after 5 PM on weekdays.   If you have been asked to come to a sleep study, make sure you bring toiletries, a comfy pillow, and any nighttime medications that you may regularly take. Also be sure to eat dinner before you arrive. We generally do not provide meals.      PRESCRIPTIONS:  We require 7 days advanced notice for prescription refills. If we do not receive the request in this time, we cannot guarantee that your medication will be refilled in time. Please contact the sleep nurses listed below for refills or request via [employer].     IMPORTANT PHONE NUMBERS:   Sleep Medicine Clinic Fax: [phone]  Appointments (for Pediatric Sleep Clinic): [phone] - option 1  Appointments (for Adult Sleep Clinic): [phone] - option 2  Appointments (For Sleep Studies): [phone] - option 3  Behavioral Sleep Medicine: [phone]  Sleep Surgery: [phone]  ENT (Otolaryngology): [phone]  Headache Clinic (Neurology): [phone]  Neurology: [phone]  Psychiatry: [phone]  Pulmonary Function Testing (PFT) Center: [phone]  Pulmonary Medicine: [phone]  [employer] (DME): [phone]  [employer] (DME): [phone]  [facility] (DME): [phone]    Our Adult Sleep Medicine Team (Please do not hesitate to call the office or sleep nurse with any  questions between appointments):    Adult Sleep Nurses (Viridiana Quezada, RN and Valentina Mccabe, RN):  For clinical questions and refilling prescriptions: [phone]  Email sleep diaries and other documents at: [email or web address]    Adult Sleep Medicine Secretaries:  Naomy Gregg (For Ankit/Jackson/Krise/[place]rohl/Yeh):   P: [phone]  F: [phone]  Laura Arndt (For [place]): P: [phone]  Fax: [phone]  Nimo Rashi (For [place]/Blank): P: [phone]  F: [phone]  Pilar Hummel (For [place]): P: [phone]  F: [phone]  Blanquita Krishnan (For [place]/Zakhary): P: [phone]  F: [phone]  Zuly Carlos (For [place]): P: [phone]  F: [phone]     Adult Sleep Medicine Advanced Practice Providers:  Alvarado Vidales ([place], [place])  Ankita Lawrence ([facility])  Shila Young CNP ([place], [place], [place])  Leela Solis CNP ([place], [place], [place])  Marina Kee ([facility], [place], [place])  Darinel Westfall CNP ([facility])      Our Sleep Testing Center (STC) Locations:  Our team will contact you to schedule your sleep study, however, you can contact us as follow:  Main Phone Line (scheduling only): [phone] ([phone]), option 3  Adult and Pediatric Locations  [facility] (6 years and older): [facility] - 4th floor ([address] [place] [address]) After hours line: [phone]  [facility] (Main campus: All ages): [facility], 6th floor. After hours line: [phone]  [facility] (5 years and older; younger considered on case-by-case basis): [address]; [address], [address]. Scheduling  After hours line: [phone]   [place] (6 years and older): [address]; [address]; [address]   [place] (6 years and older): [address] [facility], [address]  After hours line: [phone]   [place] (13 years and older) in  "[place]: [address], 2nd floor  After hours line: [phone]   [place] (13 year and older): [address]  After hours line: [phone] (Home studies out of [facility])    Adult Only Locations:   [place] (18 years and older): [address], 2nd floor   [place] (18 years and older): [address] [facility]; 4th floor  After hours line: [phone]  [facility] (18 years and older) at [place]: [address] [place] [address]  After hours line: [phone]        CONTACTING YOUR SLEEP MEDICINE PROVIDER:  Send a message directly to your provider through \"My Chart\", which is the email service through your  Records Account: [email or web address]   Call [phone] and leave a message. One of the administrative assistants will forward the message to your sleep medicine provider through \"My Chart\" and/or email.     Your sleep medicine provider for this visit was: Shila Young, APRN-CNP    In the event that you are running more than 15 minutes late to your appointment, I will kindly ask you to reschedule.       "

## 2024-07-23 NOTE — ASSESSMENT & PLAN NOTE
Suspected sleep apnea with history of snoring, gasping, witnessed apnea per .  Will proceed with HSAT mail out and plan for auto PAP when testing complete  Discussed dental appliance and Inspire briefly today as well.   Follow up in the fall for anticipated CPAP compliance. She is agreeable. Ok to communicate via [facility] [facility].

## 2024-07-23 NOTE — PROGRESS NOTES
Patient: Elinor Velazquez    [id number]  : 1970 -- AGE 54 y.o.    Provider: RHONDA Browning     Location [facility]   Service Date: 2024              [facility]  New Visit Note      HISTORY OF PRESENT ILLNESS     The patient's referring provider is: Jaqui Contreras MD    HISTORY OF PRESENT ILLNESS   Elinor Velazquez is a 54 y.o. female who presents to a [facility] for a sleep medicine evaluation with concerns of suspected sleep apnea.  in a call center     The patient has h/o allergic rhinitis, obesity, GERD, IBS, chronic kidney disease, bipolar 1 disorder, depression, anxiety, migraine, asthma.     Past Sleep History  Patient has not had a sleep study in the past.     Current History    On today's visit, the patient reports snoring, witnessed apnea per her . Notes she has gasped herself awake at times, when she sleeps on her back. Does not think she snores when she sleeps on her side. Feels she sleeps well. Goes to bed early and wakes up late. Wakes to use the bathroom on occasion. Back to sleep easily.     Has lost a little weight recently- changing diet, increase in water, not as much fast food    Sleep schedule  on weekdays / work days:  Usual Bedtime:    10 pm  Falls asleep around:  30 min  # of awakenings: bathroom sometimes  Wake time:  8 am    Naps: none    Preferred sleeping position: side, back    Sleep-related ROS:    Problems going to sleep: No problems going to sleep    Problems staying asleep Problems Staying Asleep: nocturia and breathing problems    Breathing during sleep: snoring, snorting during sleep, witnessed apneas, and gasping/choking for air    Daytime Symptoms  On awakening patient reports: morning headaches, morning dry mouth, and morning sore throat    RLS screen:  RLSSCREEN: - Sensations: Patient does not have unusual sensations in their extremities that cause an  urge to move them     Sleep-related behaviors:   dreams upon falling asleep    Fatigue: symptoms bothersome, but easily able to carry out all usual work/school/family activities    ESS: 9   MARLYS: 7  FOSQ:  30      REVIEW OF SYSTEMS     REVIEW OF SYSTEMS  Review of Systems   All other systems reviewed and are negative.    ALLERGIES AND MEDICATIONS     ALLERGIES  Allergies   Allergen Reactions    Carbamazepine Wheezing     Wheezing and chest tightness    Cat Dander Itching    Gabapentin Unknown    Grass Pollen Runny nose    House Dust Runny nose    Tree And Shrub Pollen Runny nose    Valacyclovir Wheezing     ppts asthma attack       MEDICATIONS  Current Outpatient Medications   Medication Sig Dispense Refill    albuterol 90 mcg/actuation inhaler 2 puffs every 4 hours if needed.      ARIPiprazole (Abilify) 10 mg tablet Take 1 tablet (10 mg) by mouth once daily.      cholecalciferol (Vitamin D-3) 50 mcg (2,000 unit) capsule Take 1 capsule (50 mcg) by mouth early in the morning..      ciclopirox (Penlac) 8 % solution Apply topically once daily at bedtime. 6.6 mL 3    clonazePAM (KlonoPIN) 0.5 mg tablet Take 1 tablet (0.5 mg) by mouth once daily.      DULoxetine (Cymbalta) 60 mg DR capsule Take 1 capsule (60 mg) by mouth once daily.      lamoTRIgine (LaMICtal) 200 mg tablet Take 1 tablet (200 mg) by mouth once daily.      levalbuterol (Xopenex) 0.63 mg/3 mL nebulizer solution Take 1 ampule by nebulization every 6 hours if needed for wheezing. 72 mL 2    levonorgestrel (Mirena) 21 mcg/24 hours (8 yrs) 52 mg IUD 52 mg by intrauterine route 1 time.      lithium 300 mg capsule Take 2 capsules (600 mg) by mouth once daily.      multivitamin Complete formula with  (Complete ) 3,000-800 unit-mcg capsule Take 1 capsule by mouth once daily.      omeprazole OTC (PriLOSEC OTC) 20 mg EC tablet Take 1 tablet (20 mg) by mouth once daily.      pyridoxine (Vitamin B-6) 50 mg tablet Take 1 tablet (50 mg) by mouth once daily.    "    No current facility-administered medications for this visit.         PAST HISTORY     PAST MEDICAL HISTORY  History reviewed. No pertinent past medical history.    PAST SURGICAL HISTORY:  Past Surgical History:   Procedure Laterality Date    ANKLE FRACTURE SURGERY Left     DILATION AND CURETTAGE OF UTERUS      WISDOM TOOTH EXTRACTION         FAMILY HISTORY  Family History   Problem Relation Name Age of Onset    Bipolar disorder Mother      Osteoporosis Mother      Diabetes Father      Emphysema Maternal Grandmother      Lung cancer Maternal Grandfather         DOES/DOES NOT EC: does not have a family history of sleep disorder. Not aware of.       SOCIAL HISTORY  She  reports that she quit smoking about 30 years ago. Her smoking use included cigarettes. She started smoking about 34 years ago. She has a 1 pack-year smoking history. She has never been exposed to tobacco smoke. She has never used smokeless tobacco. She reports current alcohol use. She reports current drug use. Drug: Marijuana. She currently lives with her .     Caffeine consumption: 3x per day, sometimes after 4 PM  Alcohol consumption: 1x week  Smoking: none  Marijuana: yes    PHYSICAL EXAM     VITAL SIGNS: /82 (BP Location: Left arm, Patient Position: Sitting)   Pulse 71   Ht 1.702 m (5' 7\")   Wt 119 kg (262 lb)   BMI 41.04 kg/m²      PREVIOUS WEIGHTS:  Wt Readings from Last 3 Encounters:   07/23/24 119 kg (262 lb)   05/06/24 124 kg (274 lb)   04/03/24 124 kg (274 lb)       Physical Exam  Physical Exam   Constitutional: Alert and oriented, cooperative, no obvious distress   HEENT: Non icteric or anemic, EOM WNL bilaterally     Upper Airway Examination:   Modified Mallampati Class: 1  OP Lateral wall narrowing rdgrdrrdarddrderd:rd rd3rd Tonsil ndGndrndanddndend:nd nd2nd No high arched palate  Tongue Scalloping: Y  Retrognathia: N  Overjet: N    Neck: Supple, no JVD, no goiter, no adenopathy  Chest: CTA bilaterally, no wheezing, crackles, rubs   Cardiac: RRR, S1 and " S2, no murmur, rub, thrill   Abdomen: Obese, Soft, nontender, no masses, no organomegaly   Extremities: No clubbing, no LL edema   Neuromuscular: Cranial nerves grossly intact, no focal deficits      RESULTS/DATA     Bicarbonate (mmol/L)   Date Value   05/06/2024 27       ASSESSMENT/PLAN     Ms. Velazquez is a 54 y.o. female and She was referred to the [facility] for evaluation of suspected sleep apnea.    Problem List and Orders  Problem List Items Addressed This Visit             ICD-10-CM    Apnea R06.81    Suspected sleep apnea - Primary R29.818     Suspected sleep apnea with history of snoring, gasping, witnessed apnea per .  Will proceed with HSAT mail out and plan for auto PAP when testing complete  Discussed dental appliance and Inspire briefly today as well.   Follow up in the fall for anticipated CPAP compliance. She is agreeable. Ok to communicate via [employer].          Relevant Orders    Home sleep apnea test (HSAT)

## 2024-08-04 ENCOUNTER — PROCEDURE VISIT (OUTPATIENT)
Dept: SLEEP MEDICINE | Facility: HOSPITAL | Age: 54
End: 2024-08-04
Payer: COMMERCIAL

## 2024-08-04 DIAGNOSIS — R29.818 SUSPECTED SLEEP APNEA: ICD-10-CM

## 2024-08-04 PROCEDURE — 95806 SLEEP STUDY UNATT&RESP EFFT: CPT | Performed by: INTERNAL MEDICINE

## 2024-08-13 ENCOUNTER — TELEPHONE (OUTPATIENT)
Dept: PRIMARY CARE | Facility: CLINIC | Age: 54
End: 2024-08-13
Payer: COMMERCIAL

## 2024-08-13 DIAGNOSIS — G47.33 OSA (OBSTRUCTIVE SLEEP APNEA): Primary | ICD-10-CM

## 2024-08-13 NOTE — TELEPHONE ENCOUNTER
Left message on patient's voicemail to call the office to retrieve message from the Doctor. Also let the message on [employer].

## 2024-08-13 NOTE — TELEPHONE ENCOUNTER
----- Message from Jaqui Contreras sent at 8/12/2024  4:33 PM EDT -----  The sleep center did forward me a copy of her home sleep study which shows significant obstructive sleep apnea.  Her oxygen levels dipped multiple times throughout the night showing significant moderate to severe sleep apnea.  Please get or keep the appointment with sleep study and sleep medicine so that she can get started on CPAP and treatment options for obstructive sleep apnea.  In the meantime try to sleep on her side or stomach and avoid sleeping on her back as that will make the apnea worse.  Weight loss can also help with the sleep apnea.

## 2025-02-11 ENCOUNTER — APPOINTMENT (OUTPATIENT)
Dept: SLEEP MEDICINE | Facility: CLINIC | Age: 55
End: 2025-02-11
Payer: COMMERCIAL

## 2025-04-04 ENCOUNTER — APPOINTMENT (OUTPATIENT)
Dept: PRIMARY CARE | Facility: CLINIC | Age: 55
End: 2025-04-04
Payer: COMMERCIAL

## 2025-04-11 ENCOUNTER — APPOINTMENT (OUTPATIENT)
Dept: PRIMARY CARE | Facility: CLINIC | Age: 55
End: 2025-04-11
Payer: COMMERCIAL

## 2025-05-29 ENCOUNTER — APPOINTMENT (OUTPATIENT)
Dept: SLEEP MEDICINE | Facility: CLINIC | Age: 55
End: 2025-05-29
Payer: COMMERCIAL